# Patient Record
Sex: FEMALE | Race: WHITE | Employment: OTHER | URBAN - METROPOLITAN AREA
[De-identification: names, ages, dates, MRNs, and addresses within clinical notes are randomized per-mention and may not be internally consistent; named-entity substitution may affect disease eponyms.]

---

## 2022-07-14 NOTE — PATIENT DISCUSSION
Treat with tobramycin/dexamethasone eyedrops 3 times per day for 7 days along with refresh artificial tears 3 times per day. Instructed to stop contact lenses for 2 weeks. Return for office call in 2 weeks if no improvement. Treating as a recurring viral conjunctivitis.

## 2024-05-07 ENCOUNTER — ESTABLISHED PATIENT (OUTPATIENT)
Dept: URBAN - METROPOLITAN AREA CLINIC 19 | Facility: CLINIC | Age: 59
End: 2024-05-07

## 2024-05-07 DIAGNOSIS — H04.123: ICD-10-CM

## 2024-05-07 DIAGNOSIS — H52.13: ICD-10-CM

## 2024-05-07 DIAGNOSIS — H25.13: ICD-10-CM

## 2024-05-07 PROCEDURE — 92015 DETERMINE REFRACTIVE STATE: CPT

## 2024-05-07 PROCEDURE — 92310J CONTACT LENS FITTING 49

## 2024-05-07 PROCEDURE — 92014 COMPRE OPH EXAM EST PT 1/>: CPT

## 2024-05-07 ASSESSMENT — TONOMETRY
OD_IOP_MMHG: 20
OS_IOP_MMHG: 17

## 2024-05-07 ASSESSMENT — VISUAL ACUITY
OD_CC: 20/20
OU_CC: 20/25
OU_CC: 20/25

## 2024-05-07 ASSESSMENT — KERATOMETRY
OD_AXISANGLE_DEGREES: 136
OD_AXISANGLE2_DEGREES: 46
OD_K2POWER_DIOPTERS: 46.50
OD_K1POWER_DIOPTERS: 46.00
OS_AXISANGLE_DEGREES: 20
OS_K2POWER_DIOPTERS: 46.75
OS_AXISANGLE2_DEGREES: 110
OS_K1POWER_DIOPTERS: 46.25

## 2025-05-12 ENCOUNTER — COMPREHENSIVE EXAM (OUTPATIENT)
Age: 60
End: 2025-05-12

## 2025-05-12 DIAGNOSIS — H04.123: ICD-10-CM

## 2025-05-12 DIAGNOSIS — H52.13: ICD-10-CM

## 2025-05-12 DIAGNOSIS — H25.13: ICD-10-CM

## 2025-05-12 PROCEDURE — 92015 DETERMINE REFRACTIVE STATE: CPT

## 2025-05-12 PROCEDURE — 92310-1 LEVEL 1 SOFT LENS UPDATE

## 2025-05-12 PROCEDURE — 92014 COMPRE OPH EXAM EST PT 1/>: CPT
